# Patient Record
Sex: FEMALE | Race: WHITE | NOT HISPANIC OR LATINO | Employment: OTHER | URBAN - NONMETROPOLITAN AREA
[De-identification: names, ages, dates, MRNs, and addresses within clinical notes are randomized per-mention and may not be internally consistent; named-entity substitution may affect disease eponyms.]

---

## 2018-07-15 ENCOUNTER — HOSPITAL ENCOUNTER (EMERGENCY)
Facility: HOSPITAL | Age: 73
Discharge: HOME | End: 2018-07-15
Attending: EMERGENCY MEDICINE | Admitting: EMERGENCY MEDICINE

## 2018-07-15 ENCOUNTER — APPOINTMENT (OUTPATIENT)
Dept: GENERAL RADIOLOGY | Facility: HOSPITAL | Age: 73
End: 2018-07-15

## 2018-07-15 VITALS
HEIGHT: 63 IN | BODY MASS INDEX: 31.18 KG/M2 | TEMPERATURE: 97.5 F | OXYGEN SATURATION: 96 % | RESPIRATION RATE: 18 BRPM | HEART RATE: 61 BPM | SYSTOLIC BLOOD PRESSURE: 147 MMHG | DIASTOLIC BLOOD PRESSURE: 87 MMHG | WEIGHT: 176 LBS

## 2018-07-15 DIAGNOSIS — S63.501A SPRAIN OF RIGHT WRIST, INITIAL ENCOUNTER: Primary | ICD-10-CM

## 2018-07-15 DIAGNOSIS — S52.501A CLOSED FRACTURE OF DISTAL END OF RIGHT RADIUS, UNSPECIFIED FRACTURE MORPHOLOGY, INITIAL ENCOUNTER: ICD-10-CM

## 2018-07-15 DIAGNOSIS — S52.614A CLOSED NONDISPLACED FRACTURE OF STYLOID PROCESS OF RIGHT ULNA, INITIAL ENCOUNTER: ICD-10-CM

## 2018-07-15 PROCEDURE — 73110 X-RAY EXAM OF WRIST: CPT

## 2018-07-15 PROCEDURE — 99283 EMERGENCY DEPT VISIT LOW MDM: CPT

## 2018-07-15 PROCEDURE — 73090 X-RAY EXAM OF FOREARM: CPT

## 2018-07-15 RX ORDER — HYDROCODONE BITARTRATE AND ACETAMINOPHEN 5; 325 MG/1; MG/1
1 TABLET ORAL ONCE
Status: COMPLETED | OUTPATIENT
Start: 2018-07-15 | End: 2018-07-15

## 2018-07-15 RX ORDER — HYDROCODONE BITARTRATE AND ACETAMINOPHEN 5; 325 MG/1; MG/1
1 TABLET ORAL EVERY 6 HOURS PRN
Qty: 13 TABLET | Refills: 0 | Status: SHIPPED | OUTPATIENT
Start: 2018-07-15 | End: 2018-07-23 | Stop reason: SDUPTHER

## 2018-07-15 RX ADMIN — HYDROCODONE BITARTRATE AND ACETAMINOPHEN 1 TABLET: 5; 325 TABLET ORAL at 07:32

## 2018-07-15 NOTE — ED PROVIDER NOTES
Subjective   Patient presents with a fall on outstretched hand today.  Patient was getting up early to see her son off who is leaving town today.  Patient got tripped up and fell forward on her hand.  There is no significant deformity, but soft tissue swelling and pain.  Patient is had her hand in a wrist splint that she had in her home.  Patient not hear a click pop at the time.  Patient is noticed the pain.  Her son got off on his way and she decided she'd be seen in the ER.  Patient has had a 3/10 at this time.            Review of Systems   Constitutional: Negative.  Negative for appetite change, chills and fever.   HENT: Negative.  Negative for congestion.    Eyes: Negative.  Negative for photophobia and visual disturbance.   Respiratory: Negative.  Negative for cough, chest tightness and shortness of breath.    Cardiovascular: Negative.  Negative for chest pain and palpitations.   Gastrointestinal: Negative.  Negative for abdominal pain, constipation, diarrhea, nausea and vomiting.   Endocrine: Negative.    Genitourinary: Negative.  Negative for decreased urine volume, dysuria, flank pain and hematuria.   Musculoskeletal: Negative.  Negative for arthralgias, back pain, myalgias, neck pain and neck stiffness.   Skin: Negative.  Negative for pallor.   Neurological: Negative.  Negative for dizziness, syncope, weakness, light-headedness, numbness and headaches.   Psychiatric/Behavioral: Negative.  Negative for confusion and suicidal ideas. The patient is not nervous/anxious.        Past Medical History:   Diagnosis Date   • Asthma    • COPD (chronic obstructive pulmonary disease) (CMS/[facility])    • Hx of multiple sclerosis    • Sleep apnea        Allergies   Allergen Reactions   • Erythromycin Nausea And Vomiting   • Naproxen Swelling   • Amoxicillin Rash   • Codeine Other (See Comments)     headache   • Doxycycline Swelling   • Oxcarbazepine Rash   • Penicillins Rash   • Phenytoin Sodium Extended Rash       History  reviewed. No pertinent surgical history.    History reviewed. No pertinent family history.    Social History     Social History   • Marital status:      Social History Main Topics   • Smoking status: Never Smoker   • Alcohol use No   • Drug use: No     Other Topics Concern   • Not on file           Objective   Physical Exam   Constitutional: She is oriented to person, place, and time. She appears well-developed and well-nourished. No distress.   HENT:   Head: Normocephalic and atraumatic.   Eyes: Conjunctivae and EOM are normal. No scleral icterus.   Neck: Normal range of motion. Neck supple. No JVD present.   Pulmonary/Chest: Effort normal. No respiratory distress.   Abdominal: Soft. She exhibits no distension. There is no tenderness.   Musculoskeletal: She exhibits edema and tenderness. She exhibits no deformity.   Patient with soft tissue swelling mostly to the dorsum of the right wrist with ecchymosis in the area.  There is no significant deformity to the area.  There is 2+ radial ulnar pulses.  Patient is full range of motion of the hand.   Lymphadenopathy:     She has no cervical adenopathy.   Neurological: She is alert and oriented to person, place, and time. No cranial nerve deficit. She exhibits normal muscle tone.   Skin: Skin is warm and dry. No rash noted. She is not diaphoretic. No erythema. No pallor.   Psychiatric: She has a normal mood and affect. Her behavior is normal. Judgment and thought content normal.   Nursing note and vitals reviewed.      Procedures           ED Course        Awaiting x-rays          MDM  Number of Diagnoses or Management Options  Closed fracture of distal end of right radius, unspecified fracture morphology, initial encounter:   Closed nondisplaced fracture of styloid process of right ulna, initial encounter:   Sprain of right wrist, initial encounter:   Diagnosis management comments: Patient with a distal radius fracture with joint space involvement and a ulnar  styloid fracture.  Placed in sugar tong splint.  Follow with orthopedics.       Amount and/or Complexity of Data Reviewed  Tests in the radiology section of CPT®: reviewed          Final diagnoses:   Sprain of right wrist, initial encounter   Closed fracture of distal end of right radius, unspecified fracture morphology, initial encounter   Closed nondisplaced fracture of styloid process of right ulna, initial encounter            Sergo Morton MD  07/15/18 0737

## 2018-07-17 ENCOUNTER — OFFICE VISIT (OUTPATIENT)
Dept: ORTHOPEDIC SURGERY | Facility: CLINIC | Age: 73
End: 2018-07-17

## 2018-07-17 VITALS — HEIGHT: 63 IN | BODY MASS INDEX: 32.6 KG/M2 | WEIGHT: 184 LBS

## 2018-07-17 DIAGNOSIS — S52.501A CLOSED TRAUMATIC MINIMALLY DISPLACED FRACTURE OF DISTAL END OF RIGHT RADIUS, INITIAL ENCOUNTER: Primary | ICD-10-CM

## 2018-07-17 DIAGNOSIS — M25.531 RIGHT WRIST PAIN: ICD-10-CM

## 2018-07-17 DIAGNOSIS — Z87.891 HISTORY OF CIGARETTE SMOKING: ICD-10-CM

## 2018-07-17 DIAGNOSIS — J44.9 CHRONIC OBSTRUCTIVE PULMONARY DISEASE, UNSPECIFIED COPD TYPE (HCC): ICD-10-CM

## 2018-07-17 DIAGNOSIS — Z79.01 ANTICOAGULATED ON COUMADIN: ICD-10-CM

## 2018-07-17 DIAGNOSIS — I10 ESSENTIAL HYPERTENSION: ICD-10-CM

## 2018-07-17 DIAGNOSIS — G35 MULTIPLE SCLEROSIS (HCC): ICD-10-CM

## 2018-07-17 DIAGNOSIS — I48.20 ATRIAL FIBRILLATION, CHRONIC (HCC): ICD-10-CM

## 2018-07-17 PROCEDURE — 99203 OFFICE O/P NEW LOW 30 MIN: CPT | Performed by: ORTHOPAEDIC SURGERY

## 2018-07-17 PROCEDURE — 25600 CLTX DST RDL FX/EPHYS SEP WO: CPT | Performed by: ORTHOPAEDIC SURGERY

## 2018-07-17 RX ORDER — FLUTICASONE PROPIONATE 50 MCG
2 SPRAY, SUSPENSION (ML) NASAL DAILY
COMMUNITY

## 2018-07-17 NOTE — PROGRESS NOTES
Elizabeth Griffin is a 72 y.o. female   Primary provider:  BRIGETTE Owusu       Chief Complaint   Patient presents with   • Right Wrist - Pain       HISTORY OF PRESENT ILLNESS: Patient being seen for right wrist pain due to fall occurring on 7/15/18. Patient was seen at  ED and x-rays done. Patient using shoulder immobilizer.   Patient states that she fell down at home on the 15th.  She complained of severe pain in her right wrist.  Her pain has improved since she was evaluated and placed in a splint.  She reports that she is right-handed.  She has no numbness or tingling.  He denies any syncopal episode and no loss of consciousness associated with her fall and fracture.    Pain   This is a new problem. The current episode started in the past 7 days. Associated symptoms include arthralgias and numbness. Associated symptoms comments: Aching. She has tried acetaminophen and NSAIDs for the symptoms.        CONCURRENT MEDICAL HISTORY:    Past Medical History:   Diagnosis Date   • Asthma    • Colitis    • COPD (chronic obstructive pulmonary disease) (CMS/[facility])    • Hx of multiple sclerosis    • Lupus    • Sleep apnea        Allergies   Allergen Reactions   • Erythromycin Nausea And Vomiting   • Naproxen Swelling   • Dilantin [Phenytoin] Itching     Itching and swelling   • Keflex [Cephalexin] Other (See Comments)     Yeast infections   • Menthol Swelling     Itching and swelling   • Amoxicillin Rash   • Codeine Other (See Comments)     headache   • Doxycycline Swelling   • Oxcarbazepine Rash   • Penicillins Rash   • Phenytoin Sodium Extended Rash         Current Outpatient Prescriptions:   •  albuterol (PROVENTIL HFA;VENTOLIN HFA) 108 (90 Base) MCG/ACT inhaler, Inhale 2 puffs Every 4 (Four) Hours As Needed for Wheezing., Disp: , Rfl:   •  albuterol (PROVENTIL) (5 MG/ML) 0.5% nebulizer solution, Take 2.5 mg by nebulization Every 6 (Six) Hours As Needed for Wheezing., Disp: , Rfl:   •  alendronate (FOSAMAX) 70  MG tablet, Take 70 mg by mouth Every 7 (Seven) Days., Disp: , Rfl:   •  budesonide-formoterol (SYMBICORT) 160-4.5 MCG/ACT inhaler, Inhale 2 puffs 2 (Two) Times a Day., Disp: , Rfl:   •  calcium carbonate (OS-RAISA) 600 MG tablet, Take 600 mg by mouth Daily., Disp: , Rfl:   •  carBAMazepine (TEGretol) 200 MG tablet, Take 200 mg by mouth 3 (Three) Times a Day., Disp: , Rfl:   •  Cobalamine Combinations (B-12) 100-5000 MCG sublingual tablet, Place  under the tongue., Disp: , Rfl:   •  dofetilide (TIKOSYN) 500 MCG capsule, Take 500 mcg by mouth 2 (Two) Times a Day., Disp: , Rfl:   •  fluticasone (FLONASE) 50 MCG/ACT nasal spray, 2 sprays into each nostril Daily., Disp: , Rfl:   •  fluticasone (VERAMYST) 27.5 MCG/SPRAY nasal spray, 2 sprays into each nostril Daily., Disp: , Rfl:   •  gabapentin (NEURONTIN) 800 MG tablet, Take 800 mg by mouth 3 (Three) Times a Day., Disp: , Rfl:   •  Glatiramer Acetate (COPAXONE) 40 MG/ML solution prefilled syringe, Inject  under the skin., Disp: , Rfl:   •  guaiFENesin (MUCINEX) 600 MG 12 hr tablet, Take 1,200 mg by mouth 2 (Two) Times a Day., Disp: , Rfl:   •  HYDROcodone-acetaminophen (NORCO) 5-325 MG per tablet, Take 1 tablet by mouth Every 6 (Six) Hours As Needed for Moderate Pain  or Severe Pain ., Disp: 13 tablet, Rfl: 0  •  isosorbide mononitrate (ISMO,MONOKET) 20 MG tablet, Take 20 mg by mouth 2 (Two) Times a Day., Disp: , Rfl:   •  levothyroxine (SYNTHROID, LEVOTHROID) 88 MCG tablet, Take 88 mcg by mouth Daily., Disp: , Rfl:   •  lubiprostone (AMITIZA) 24 MCG capsule, Take 24 mcg by mouth 2 (Two) Times a Day With Meals., Disp: , Rfl:   •  metoprolol tartrate (LOPRESSOR) 25 MG tablet, Take 25 mg by mouth 2 (Two) Times a Day., Disp: , Rfl:   •  montelukast (SINGULAIR) 10 MG tablet, Take 10 mg by mouth Every Night., Disp: , Rfl:   •  NITROFURANTOIN PO, Take  by mouth., Disp: , Rfl:   •  omega-3 acid ethyl esters (LOVAZA) 1 g capsule, Take 2 g by mouth 2 (Two) Times a Day., Disp: ,  "Rfl:   •  Polyethylene Glycol 3350 (MIRALAX PO), Take  by mouth., Disp: , Rfl:   •  pramipexole (MIRAPEX) 1 MG tablet, Take 1 mg by mouth 3 (Three) Times a Day., Disp: , Rfl:   •  PredniSONE 5 MG tablet therapy pack dosepak, Take 1 each by mouth. Take as directed on package instructions., Disp: , Rfl:   •  tiZANidine (ZANAFLEX) 4 MG tablet, Take 4 mg by mouth At Night As Needed for Muscle Spasms., Disp: , Rfl:   •  VALSARTAN PO, Take  by mouth., Disp: , Rfl:   •  warfarin (COUMADIN) 10 MG tablet, Take 10 mg by mouth Daily., Disp: , Rfl:   •  hydrocortisone 1 % cream, Apply  topically 3 (Three) Times a Day for 5 days., Disp: 14 g, Rfl: 0    Past Surgical History:   Procedure Laterality Date   • BLADDER SURGERY     • CATARACT EXTRACTION, BILATERAL  12/2015   • D&C AND LAPAROSCOPY     • HYSTERECTOMY  01/1973       Family History   Problem Relation Age of Onset   • Heart disease Other    • Hypertension Other    • Lung disease Other    • Diabetes Other         Social History     Social History   • Marital status:      Spouse name: N/A   • Number of children: N/A   • Years of education: N/A     Occupational History   • Not on file.     Social History Main Topics   • Smoking status: Never Smoker   • Smokeless tobacco: Never Used   • Alcohol use No   • Drug use: No   • Sexual activity: Not on file     Other Topics Concern   • Not on file     Social History Narrative   • No narrative on file        Review of Systems   HENT: Positive for nosebleeds and tinnitus.    Eyes: Positive for visual disturbance.   Genitourinary: Positive for difficulty urinating.   Musculoskeletal: Positive for arthralgias.   Skin:        itching   Neurological: Positive for dizziness and numbness.   Psychiatric/Behavioral: Positive for sleep disturbance.   All other systems reviewed and are negative.      PHYSICAL EXAMINATION:       Ht 160 cm (63\")   Wt 83.5 kg (184 lb)   BMI 32.59 kg/m²     Physical Exam   Constitutional: She is oriented to " person, place, and time. She appears well-developed and well-nourished.   Neurological: She is alert and oriented to person, place, and time.   Psychiatric: She has a normal mood and affect. Her behavior is normal. Judgment and thought content normal.       GAIT:     []  Normal  []  Antalgic    Assistive device: [x]  None  []  Walker     []  Crutches  []  Cane     []  Wheelchair  []  Stretcher    Right Hand Exam     Tenderness   The patient is experiencing tenderness in the dorsal area, johnson area and radial area.    Other   Erythema: absent  Sensation: normal  Pulse: present    Comments:  Moderate swelling.  Motion, strength, stability testing is deferred due to known fracture.      Left Hand Exam     Tenderness   The patient is experiencing no tenderness.         Range of Motion   The patient has normal left wrist ROM.    Muscle Strength   The patient has normal left wrist strength.    Other   Erythema: absent  Sensation: normal  Pulse: present              Xr Forearm 2 View Right    Result Date: 7/15/2018  Narrative: Procedure:  Right wrist. Right forearm.    Indication:  Trauma, patient fell.   . Technique:  Three views right wrist. Two views right forearm.. Prior relevant exam:  None. Comminuted closed traumatic minimally displaced fracture distal radius with intra-articular extension and dorsal tilt. Slightly displaced avulsion fracture ulnar styloid. The more proximal radius and ulna are unremarkable.     Impression: CONCLUSION: 1. Comminuted fractures distal radius with intra-articular extension and dorsal tilt. Minimally displaced avulsion fracture ulnar styloid. 2. The more proximal radius and ulna are unremarkable. Electronically signed by:  Selvin Olivas MD  7/15/2018 7:20 AM CDT Workstation: [id number]    Xr Wrist 3+ View Right    Result Date: 7/15/2018  Narrative: Procedure:  Right wrist. Right forearm.    Indication:  Trauma, patient fell.   . Technique:  Three views right wrist. Two views right  forearm.. Prior relevant exam:  None. Comminuted closed traumatic minimally displaced fracture distal radius with intra-articular extension and dorsal tilt. Slightly displaced avulsion fracture ulnar styloid. The more proximal radius and ulna are unremarkable.     Impression: CONCLUSION: 1. Comminuted fractures distal radius with intra-articular extension and dorsal tilt. Minimally displaced avulsion fracture ulnar styloid. 2. The more proximal radius and ulna are unremarkable. Electronically signed by:  Selvin Olivas MD  7/15/2018 7:20 AM CDT Workstation: [id number]          ASSESSMENT:    Diagnoses and all orders for this visit:    Closed traumatic minimally displaced fracture of distal end of right radius, initial encounter    Right wrist pain    Atrial fibrillation, chronic (CMS/HCC)    Essential hypertension    Chronic obstructive pulmonary disease, unspecified COPD type (CMS/HCC)    Multiple sclerosis (CMS/HCC)    History of cigarette smoking    Anticoagulated on Coumadin    Other orders  -     fluticasone (FLONASE) 50 MCG/ACT nasal spray; 2 sprays into each nostril Daily.  -     PredniSONE 5 MG tablet therapy pack dosepak; Take 1 each by mouth. Take as directed on package instructions.          PLAN    We had a long discussion regarding treatment options.  She has a minimally displaced intra-articular distal radius fracture in her dominant arm.  However, she has multiple comorbidities and is anticoagulated on warfarin.  She has a history of multiple sclerosis, COPD, hypertension, as well as A. fib.  We discussed treating her in a closed fashion without surgical intervention unless her fracture were to shift and be further displaced.  She'll be treated in an ex-os splint for support and we discussed the possibility of a cast if necessary but she has a skin reaction to the previous splint currently.    Patient's Body mass index is 32.59 kg/m². BMI is above normal parameters. Recommendations include: exercise  counseling and nutrition counseling.      Return in about 10 days (around 7/27/2018) for Recheck with repeat xrays.    Charles Whatley MD

## 2018-07-18 ENCOUNTER — TELEPHONE (OUTPATIENT)
Dept: ORTHOPEDIC SURGERY | Facility: CLINIC | Age: 73
End: 2018-07-18

## 2018-07-18 NOTE — TELEPHONE ENCOUNTER
If she has switched the brace, lets see how it changes with benadryl and the new brace.  If still having issues tomorrow then we can see her and decide what is next.  Thanks  ALEXA

## 2018-07-18 NOTE — TELEPHONE ENCOUNTER
WAS SEEN YESTERDAY BROKEN RT WRIST, SHE HAS BROKEN OUT WITH A RASH , ALL OVER HER ARM. [phone]  DR FORREST

## 2018-07-18 NOTE — TELEPHONE ENCOUNTER
Patient states she has rash from fingertips to elbow after wearing the Exos splint. She has been using cream on her arm as well as taking benadryl which has seemed to help. She also changed back into her velcro wrist splint. Does she need to make appointment?

## 2018-07-19 ENCOUNTER — OFFICE VISIT (OUTPATIENT)
Dept: ORTHOPEDIC SURGERY | Facility: CLINIC | Age: 73
End: 2018-07-19

## 2018-07-19 VITALS — WEIGHT: 185 LBS | HEIGHT: 63 IN | BODY MASS INDEX: 32.78 KG/M2

## 2018-07-19 DIAGNOSIS — L23.9 ALLERGIC CONTACT DERMATITIS, UNSPECIFIED TRIGGER: Primary | ICD-10-CM

## 2018-07-19 DIAGNOSIS — M25.531 RIGHT WRIST PAIN: ICD-10-CM

## 2018-07-19 PROCEDURE — 99024 POSTOP FOLLOW-UP VISIT: CPT | Performed by: NURSE PRACTITIONER

## 2018-07-19 RX ORDER — DIAPER,BRIEF,INFANT-TODD,DISP
EACH MISCELLANEOUS 3 TIMES DAILY
Qty: 14 G | Refills: 0 | Status: SHIPPED | OUTPATIENT
Start: 2018-07-19 | End: 2018-07-24

## 2018-07-19 NOTE — PROGRESS NOTES
Elizabeth Griffin is a 72 y.o. female returns for     Chief Complaint   Patient presents with   • Right Wrist - Follow-up       HISTORY OF PRESENT ILLNESS: Patient being seen for right wrist pain follow up. She was seen on 7/17/18 and exos splint placed. Patient states since wearing splint she is experiencing rash on right arm. Patient has switched to velcro wrist splint.        CONCURRENT MEDICAL HISTORY:    Past Medical History:   Diagnosis Date   • Asthma    • Colitis    • COPD (chronic obstructive pulmonary disease) (CMS/HCC)    • Hx of multiple sclerosis    • Lupus    • Sleep apnea        Allergies   Allergen Reactions   • Erythromycin Nausea And Vomiting   • Naproxen Swelling   • Dilantin [Phenytoin] Itching     Itching and swelling   • Keflex [Cephalexin] Other (See Comments)     Yeast infections   • Menthol Swelling     Itching and swelling   • Amoxicillin Rash   • Codeine Other (See Comments)     headache   • Doxycycline Swelling   • Oxcarbazepine Rash   • Penicillins Rash   • Phenytoin Sodium Extended Rash         Current Outpatient Prescriptions:   •  albuterol (PROVENTIL HFA;VENTOLIN HFA) 108 (90 Base) MCG/ACT inhaler, Inhale 2 puffs Every 4 (Four) Hours As Needed for Wheezing., Disp: , Rfl:   •  albuterol (PROVENTIL) (5 MG/ML) 0.5% nebulizer solution, Take 2.5 mg by nebulization Every 6 (Six) Hours As Needed for Wheezing., Disp: , Rfl:   •  alendronate (FOSAMAX) 70 MG tablet, Take 70 mg by mouth Every 7 (Seven) Days., Disp: , Rfl:   •  budesonide-formoterol (SYMBICORT) 160-4.5 MCG/ACT inhaler, Inhale 2 puffs 2 (Two) Times a Day., Disp: , Rfl:   •  calcium carbonate (OS-RAISA) 600 MG tablet, Take 600 mg by mouth Daily., Disp: , Rfl:   •  carBAMazepine (TEGretol) 200 MG tablet, Take 200 mg by mouth 3 (Three) Times a Day., Disp: , Rfl:   •  Cobalamine Combinations (B-12) 100-5000 MCG sublingual tablet, Place  under the tongue., Disp: , Rfl:   •  dofetilide (TIKOSYN) 500 MCG capsule, Take 500 mcg by mouth 2  (Two) Times a Day., Disp: , Rfl:   •  fluticasone (FLONASE) 50 MCG/ACT nasal spray, 2 sprays into each nostril Daily., Disp: , Rfl:   •  fluticasone (VERAMYST) 27.5 MCG/SPRAY nasal spray, 2 sprays into each nostril Daily., Disp: , Rfl:   •  gabapentin (NEURONTIN) 800 MG tablet, Take 800 mg by mouth 3 (Three) Times a Day., Disp: , Rfl:   •  Glatiramer Acetate (COPAXONE) 40 MG/ML solution prefilled syringe, Inject  under the skin., Disp: , Rfl:   •  guaiFENesin (MUCINEX) 600 MG 12 hr tablet, Take 1,200 mg by mouth 2 (Two) Times a Day., Disp: , Rfl:   •  HYDROcodone-acetaminophen (NORCO) 5-325 MG per tablet, Take 1 tablet by mouth Every 6 (Six) Hours As Needed for Moderate Pain  or Severe Pain ., Disp: 13 tablet, Rfl: 0  •  isosorbide mononitrate (ISMO,MONOKET) 20 MG tablet, Take 20 mg by mouth 2 (Two) Times a Day., Disp: , Rfl:   •  levothyroxine (SYNTHROID, LEVOTHROID) 88 MCG tablet, Take 88 mcg by mouth Daily., Disp: , Rfl:   •  lubiprostone (AMITIZA) 24 MCG capsule, Take 24 mcg by mouth 2 (Two) Times a Day With Meals., Disp: , Rfl:   •  metoprolol tartrate (LOPRESSOR) 25 MG tablet, Take 25 mg by mouth 2 (Two) Times a Day., Disp: , Rfl:   •  montelukast (SINGULAIR) 10 MG tablet, Take 10 mg by mouth Every Night., Disp: , Rfl:   •  NITROFURANTOIN PO, Take  by mouth., Disp: , Rfl:   •  omega-3 acid ethyl esters (LOVAZA) 1 g capsule, Take 2 g by mouth 2 (Two) Times a Day., Disp: , Rfl:   •  Polyethylene Glycol 3350 (MIRALAX PO), Take  by mouth., Disp: , Rfl:   •  pramipexole (MIRAPEX) 1 MG tablet, Take 1 mg by mouth 3 (Three) Times a Day., Disp: , Rfl:   •  PredniSONE 5 MG tablet therapy pack dosepak, Take 1 each by mouth. Take as directed on package instructions., Disp: , Rfl:   •  tiZANidine (ZANAFLEX) 4 MG tablet, Take 4 mg by mouth At Night As Needed for Muscle Spasms., Disp: , Rfl:   •  VALSARTAN PO, Take  by mouth., Disp: , Rfl:   •  warfarin (COUMADIN) 10 MG tablet, Take 10 mg by mouth Daily., Disp: , Rfl:   •   "hydrocortisone 1 % cream, Apply  topically 3 (Three) Times a Day for 5 days., Disp: 14 g, Rfl: 0    Past Surgical History:   Procedure Laterality Date   • BLADDER SURGERY     • CATARACT EXTRACTION, BILATERAL  12/2015   • D&C AND LAPAROSCOPY     • HYSTERECTOMY  01/1973       ROS  No fevers or chills.  No chest pain or shortness of air.  No GI or  disturbances.    PHYSICAL EXAMINATION:       Ht 160 cm (63\")   Wt 83.9 kg (185 lb)   BMI 32.77 kg/m²     Physical Exam   Constitutional: She is oriented to person, place, and time. Vital signs are normal. She appears well-developed and well-nourished. She is cooperative.   HENT:   Head: Normocephalic and atraumatic.   Neck: Trachea normal and phonation normal.   Pulmonary/Chest: Effort normal. No respiratory distress.   Abdominal: Soft. Normal appearance. She exhibits no distension.   Neurological: She is alert and oriented to person, place, and time. GCS eye subscore is 4. GCS verbal subscore is 5. GCS motor subscore is 6.   Skin: Skin is warm, dry and intact.   Psychiatric: She has a normal mood and affect. Her speech is normal and behavior is normal. Judgment and thought content normal. Cognition and memory are normal.   Vitals reviewed.      GAIT:     []  Normal  []  Antalgic    Assistive device: [x]  None  []  Walker     []  Crutches  []  Cane     []  Wheelchair  []  Stretcher    Right Hand Exam     Comments:  Diffuse rash noted from the wrist and elbow were the patient had contact with the exos splint   No evidence of infection       Left Hand Exam   Left hand exam is normal.              Xr Forearm 2 View Right    Result Date: 7/15/2018  Narrative: Procedure:  Right wrist. Right forearm.    Indication:  Trauma, patient fell.   . Technique:  Three views right wrist. Two views right forearm.. Prior relevant exam:  None. Comminuted closed traumatic minimally displaced fracture distal radius with intra-articular extension and dorsal tilt. Slightly displaced avulsion " fracture ulnar styloid. The more proximal radius and ulna are unremarkable.     Impression: CONCLUSION: 1. Comminuted fractures distal radius with intra-articular extension and dorsal tilt. Minimally displaced avulsion fracture ulnar styloid. 2. The more proximal radius and ulna are unremarkable. Electronically signed by:  Selvin Olivas MD  7/15/2018 7:20 AM CDT Workstation: [id number]    Xr Wrist 3+ View Right    Result Date: 7/15/2018  Narrative: Procedure:  Right wrist. Right forearm.    Indication:  Trauma, patient fell.   . Technique:  Three views right wrist. Two views right forearm.. Prior relevant exam:  None. Comminuted closed traumatic minimally displaced fracture distal radius with intra-articular extension and dorsal tilt. Slightly displaced avulsion fracture ulnar styloid. The more proximal radius and ulna are unremarkable.     Impression: CONCLUSION: 1. Comminuted fractures distal radius with intra-articular extension and dorsal tilt. Minimally displaced avulsion fracture ulnar styloid. 2. The more proximal radius and ulna are unremarkable. Electronically signed by:  Selvin Olivas MD  7/15/2018 7:20 AM CDT Workstation: [id number]            ASSESSMENT:    Diagnoses and all orders for this visit:    Allergic contact dermatitis, unspecified trigger    Right wrist pain    Other orders  -     hydrocortisone 1 % cream; Apply  topically 3 (Three) Times a Day for 5 days.          PLAN  Recommend topical steroid application, the splint was changed to a plan wrist control splint and patient was instructed to follow up in one week for recheck with Dr. Whatley.   No Follow-up on file.    BRIGETTE Ram

## 2018-07-24 RX ORDER — HYDROCODONE BITARTRATE AND ACETAMINOPHEN 5; 325 MG/1; MG/1
1 TABLET ORAL EVERY 6 HOURS PRN
Qty: 13 TABLET | Refills: 0 | Status: SHIPPED | OUTPATIENT
Start: 2018-07-24 | End: 2018-07-24 | Stop reason: ALTCHOICE

## 2018-07-24 RX ORDER — HYDROCODONE BITARTRATE AND ACETAMINOPHEN 5; 325 MG/1; MG/1
1 TABLET ORAL EVERY 6 HOURS PRN
Qty: 30 TABLET | Refills: 0 | Status: SHIPPED | OUTPATIENT
Start: 2018-07-24 | End: 2018-07-27

## 2018-07-27 ENCOUNTER — OFFICE VISIT (OUTPATIENT)
Dept: ORTHOPEDIC SURGERY | Facility: CLINIC | Age: 73
End: 2018-07-27

## 2018-07-27 VITALS — WEIGHT: 178.5 LBS | HEIGHT: 63 IN | BODY MASS INDEX: 31.63 KG/M2

## 2018-07-27 DIAGNOSIS — I48.20 ATRIAL FIBRILLATION, CHRONIC (HCC): ICD-10-CM

## 2018-07-27 DIAGNOSIS — S52.501A CLOSED TRAUMATIC MINIMALLY DISPLACED FRACTURE OF DISTAL END OF RIGHT RADIUS, INITIAL ENCOUNTER: Primary | ICD-10-CM

## 2018-07-27 DIAGNOSIS — J44.9 CHRONIC OBSTRUCTIVE PULMONARY DISEASE, UNSPECIFIED COPD TYPE (HCC): ICD-10-CM

## 2018-07-27 DIAGNOSIS — G35 MULTIPLE SCLEROSIS (HCC): ICD-10-CM

## 2018-07-27 DIAGNOSIS — M25.531 RIGHT WRIST PAIN: ICD-10-CM

## 2018-07-27 PROCEDURE — 99024 POSTOP FOLLOW-UP VISIT: CPT | Performed by: ORTHOPAEDIC SURGERY

## 2018-07-27 NOTE — PROGRESS NOTES
Elizabeth Griffin is a 73 y.o. female returns for     Chief Complaint   Patient presents with   • Right Wrist - Follow-up       HISTORY OF PRESENT ILLNESS: Patient being seen for right wrist follow up. Patient states rash has went away with use of steroid cream. Patient states she has had an allergic reaction to Norco causing chest pain. Patient also demonstrates decreased balance. X-rays today.   Not sleeping well but slowly getting better.       CONCURRENT MEDICAL HISTORY:    Past Medical History:   Diagnosis Date   • Asthma    • Colitis    • COPD (chronic obstructive pulmonary disease) (CMS/[facility])    • Hx of multiple sclerosis    • Lupus    • Sleep apnea        Allergies   Allergen Reactions   • Erythromycin Nausea And Vomiting   • Naproxen Swelling   • Dilantin [Phenytoin] Itching     Itching and swelling   • Keflex [Cephalexin] Other (See Comments)     Yeast infections   • Menthol Swelling     Itching and swelling   • Norco [Hydrocodone-Acetaminophen] Other (See Comments)     Chest pain   • Amoxicillin Rash   • Codeine Other (See Comments)     headache   • Doxycycline Swelling   • Oxcarbazepine Rash   • Penicillins Rash   • Phenytoin Sodium Extended Rash         Current Outpatient Prescriptions:   •  albuterol (PROVENTIL HFA;VENTOLIN HFA) 108 (90 Base) MCG/ACT inhaler, Inhale 2 puffs Every 4 (Four) Hours As Needed for Wheezing., Disp: , Rfl:   •  albuterol (PROVENTIL) (5 MG/ML) 0.5% nebulizer solution, Take 2.5 mg by nebulization Every 6 (Six) Hours As Needed for Wheezing., Disp: , Rfl:   •  alendronate (FOSAMAX) 70 MG tablet, Take 70 mg by mouth Every 7 (Seven) Days., Disp: , Rfl:   •  budesonide-formoterol (SYMBICORT) 160-4.5 MCG/ACT inhaler, Inhale 2 puffs 2 (Two) Times a Day., Disp: , Rfl:   •  calcium carbonate (OS-RAISA) 600 MG tablet, Take 600 mg by mouth Daily., Disp: , Rfl:   •  carBAMazepine (TEGretol) 200 MG tablet, Take 200 mg by mouth 3 (Three) Times a Day., Disp: , Rfl:   •  Cobalamine Combinations  (B-12) 100-5000 MCG sublingual tablet, Place  under the tongue., Disp: , Rfl:   •  dofetilide (TIKOSYN) 500 MCG capsule, Take 500 mcg by mouth 2 (Two) Times a Day., Disp: , Rfl:   •  fluticasone (FLONASE) 50 MCG/ACT nasal spray, 2 sprays into each nostril Daily., Disp: , Rfl:   •  fluticasone (VERAMYST) 27.5 MCG/SPRAY nasal spray, 2 sprays into each nostril Daily., Disp: , Rfl:   •  gabapentin (NEURONTIN) 800 MG tablet, Take 800 mg by mouth 3 (Three) Times a Day., Disp: , Rfl:   •  Glatiramer Acetate (COPAXONE) 40 MG/ML solution prefilled syringe, Inject  under the skin., Disp: , Rfl:   •  guaiFENesin (MUCINEX) 600 MG 12 hr tablet, Take 1,200 mg by mouth 2 (Two) Times a Day., Disp: , Rfl:   •  isosorbide mononitrate (ISMO,MONOKET) 20 MG tablet, Take 20 mg by mouth 2 (Two) Times a Day., Disp: , Rfl:   •  levothyroxine (SYNTHROID, LEVOTHROID) 88 MCG tablet, Take 88 mcg by mouth Daily., Disp: , Rfl:   •  lubiprostone (AMITIZA) 24 MCG capsule, Take 24 mcg by mouth 2 (Two) Times a Day With Meals., Disp: , Rfl:   •  metoprolol tartrate (LOPRESSOR) 25 MG tablet, Take 25 mg by mouth 2 (Two) Times a Day., Disp: , Rfl:   •  montelukast (SINGULAIR) 10 MG tablet, Take 10 mg by mouth Every Night., Disp: , Rfl:   •  NITROFURANTOIN PO, Take  by mouth., Disp: , Rfl:   •  omega-3 acid ethyl esters (LOVAZA) 1 g capsule, Take 2 g by mouth 2 (Two) Times a Day., Disp: , Rfl:   •  Polyethylene Glycol 3350 (MIRALAX PO), Take  by mouth., Disp: , Rfl:   •  pramipexole (MIRAPEX) 1 MG tablet, Take 1 mg by mouth 3 (Three) Times a Day., Disp: , Rfl:   •  PredniSONE 5 MG tablet therapy pack dosepak, Take 1 each by mouth. Take as directed on package instructions., Disp: , Rfl:   •  tiZANidine (ZANAFLEX) 4 MG tablet, Take 4 mg by mouth At Night As Needed for Muscle Spasms., Disp: , Rfl:   •  VALSARTAN PO, Take  by mouth., Disp: , Rfl:   •  warfarin (COUMADIN) 10 MG tablet, Take 10 mg by mouth Daily., Disp: , Rfl:     Past Surgical History:  "  Procedure Laterality Date   • BLADDER SURGERY     • CATARACT EXTRACTION, BILATERAL  12/2015   • D&C AND LAPAROSCOPY     • HYSTERECTOMY  01/1973       ROS  No fevers or chills.  No chest pain or shortness of air.  No GI or  disturbances.    PHYSICAL EXAMINATION:       Ht 160 cm (63\")   Wt 81 kg (178 lb 8 oz)   BMI 31.62 kg/m²     Physical Exam   Constitutional: She is oriented to person, place, and time. She appears well-developed and well-nourished.   Neurological: She is alert and oriented to person, place, and time.   Psychiatric: She has a normal mood and affect. Her behavior is normal. Judgment and thought content normal.       GAIT:     [x]  Normal  []  Antalgic    Assistive device: [x]  None  []  Walker     []  Crutches  []  Cane     []  Wheelchair  []  Stretcher    Right Hand Exam     Comments:  Good cap refill  Good finger motion  Tolerates a little motion but not much.              Xr Forearm 2 View Right    Result Date: 7/15/2018  Narrative: Procedure:  Right wrist. Right forearm.    Indication:  Trauma, patient fell.   . Technique:  Three views right wrist. Two views right forearm.. Prior relevant exam:  None. Comminuted closed traumatic minimally displaced fracture distal radius with intra-articular extension and dorsal tilt. Slightly displaced avulsion fracture ulnar styloid. The more proximal radius and ulna are unremarkable.     Impression: CONCLUSION: 1. Comminuted fractures distal radius with intra-articular extension and dorsal tilt. Minimally displaced avulsion fracture ulnar styloid. 2. The more proximal radius and ulna are unremarkable. Electronically signed by:  Selvin Olivas MD  7/15/2018 7:20 AM CDT Workstation: [id number]    Xr Wrist 3+ View Right    Result Date: 7/27/2018  Narrative: Ordering Provider:  Charles Whatley MD Ordering Diagnosis/Indication:  Closed traumatic minimally displaced fracture of distal end of right radius, initial encounter Procedure:  XR WRIST 3+ VW RIGHT " Exam Date:  7/27/18 COMPARISON:  Todays X-rays were compared to previous images dated July 15, 2018.     Impression:  3 views of the right wrist show acceptable position and alignment of a right comminuted intra-articular distal radius fracture.  Acceptable alignment is maintained on both the AP, oblique and lateral views.  There is mild joint space depression noted on the lateral view but unchanged from previous x-rays.  No interval change. Charles Whatley MD 7/27/18     Xr Wrist 3+ View Right    Result Date: 7/15/2018  Narrative: Procedure:  Right wrist. Right forearm.    Indication:  Trauma, patient fell.   . Technique:  Three views right wrist. Two views right forearm.. Prior relevant exam:  None. Comminuted closed traumatic minimally displaced fracture distal radius with intra-articular extension and dorsal tilt. Slightly displaced avulsion fracture ulnar styloid. The more proximal radius and ulna are unremarkable.     Impression: CONCLUSION: 1. Comminuted fractures distal radius with intra-articular extension and dorsal tilt. Minimally displaced avulsion fracture ulnar styloid. 2. The more proximal radius and ulna are unremarkable. Electronically signed by:  Selvin Olivas MD  7/15/2018 7:20 AM CDT Workstation: [id number]            ASSESSMENT:    Diagnoses and all orders for this visit:    Closed traumatic minimally displaced fracture of distal end of right radius, initial encounter    Right wrist pain    Atrial fibrillation, chronic (CMS/HCC)    Chronic obstructive pulmonary disease, unspecified COPD type (CMS/HCC)    Multiple sclerosis (CMS/HCC)          PLAN    Continue with finger motion exercises.  We also discussed slowly starting flexion and extension of the wrist.  No force or resistance.  Follow-up in 4 weeks with repeat x-rays of the right wrist.    Patient's Body mass index is 31.62 kg/m². BMI is above normal parameters. Recommendations include: exercise counseling and nutrition  counseling.      Return in about 4 weeks (around 8/24/2018) for Recheck with repeat xrays.    Charles Whatley MD

## 2018-08-27 DIAGNOSIS — S52.501A CLOSED TRAUMATIC MINIMALLY DISPLACED FRACTURE OF DISTAL END OF RIGHT RADIUS, INITIAL ENCOUNTER: Primary | ICD-10-CM

## 2018-08-28 ENCOUNTER — OFFICE VISIT (OUTPATIENT)
Dept: ORTHOPEDIC SURGERY | Facility: CLINIC | Age: 73
End: 2018-08-28

## 2018-08-28 VITALS — WEIGHT: 186 LBS | BODY MASS INDEX: 32.96 KG/M2 | HEIGHT: 63 IN

## 2018-08-28 DIAGNOSIS — M25.531 RIGHT WRIST PAIN: ICD-10-CM

## 2018-08-28 DIAGNOSIS — S52.501D CLOSED TRAUMATIC MINIMALLY DISPLACED FRACTURE OF DISTAL END OF RIGHT RADIUS WITH ROUTINE HEALING, SUBSEQUENT ENCOUNTER: Primary | ICD-10-CM

## 2018-08-28 PROCEDURE — 99024 POSTOP FOLLOW-UP VISIT: CPT | Performed by: ORTHOPAEDIC SURGERY

## 2018-10-05 DIAGNOSIS — S52.501D CLOSED TRAUMATIC MINIMALLY DISPLACED FRACTURE OF DISTAL END OF RIGHT RADIUS WITH ROUTINE HEALING, SUBSEQUENT ENCOUNTER: Primary | ICD-10-CM

## 2018-10-09 ENCOUNTER — OFFICE VISIT (OUTPATIENT)
Dept: ORTHOPEDIC SURGERY | Facility: CLINIC | Age: 73
End: 2018-10-09

## 2018-10-09 VITALS — BODY MASS INDEX: 33.84 KG/M2 | WEIGHT: 191 LBS | HEIGHT: 63 IN

## 2018-10-09 DIAGNOSIS — S52.501D CLOSED TRAUMATIC MINIMALLY DISPLACED FRACTURE OF DISTAL END OF RIGHT RADIUS WITH ROUTINE HEALING, SUBSEQUENT ENCOUNTER: Primary | ICD-10-CM

## 2018-10-09 DIAGNOSIS — M25.531 RIGHT WRIST PAIN: ICD-10-CM

## 2018-10-09 PROBLEM — S52.501A: Status: ACTIVE | Noted: 2018-10-09

## 2018-10-09 PROCEDURE — 99024 POSTOP FOLLOW-UP VISIT: CPT | Performed by: ORTHOPAEDIC SURGERY

## 2019-01-01 ENCOUNTER — APPOINTMENT (OUTPATIENT)
Dept: CT IMAGING | Facility: HOSPITAL | Age: 74
End: 2019-01-01

## 2019-01-01 ENCOUNTER — OFFICE VISIT (OUTPATIENT)
Dept: ORTHOPEDIC SURGERY | Facility: CLINIC | Age: 74
End: 2019-01-01

## 2019-01-01 ENCOUNTER — TELEPHONE (OUTPATIENT)
Dept: ORTHOPEDIC SURGERY | Facility: CLINIC | Age: 74
End: 2019-01-01

## 2019-01-01 ENCOUNTER — APPOINTMENT (OUTPATIENT)
Dept: GENERAL RADIOLOGY | Facility: HOSPITAL | Age: 74
End: 2019-01-01

## 2019-01-01 ENCOUNTER — TRANSCRIBE ORDERS (OUTPATIENT)
Dept: ORTHOPEDIC SURGERY | Facility: CLINIC | Age: 74
End: 2019-01-01

## 2019-01-01 ENCOUNTER — HOSPITAL ENCOUNTER (EMERGENCY)
Facility: HOSPITAL | Age: 74
Discharge: ANOTHER HOSPITAL | End: 2019-08-17
Attending: EMERGENCY MEDICINE | Admitting: EMERGENCY MEDICINE

## 2019-01-01 VITALS
RESPIRATION RATE: 18 BRPM | TEMPERATURE: 98.4 F | BODY MASS INDEX: 33.89 KG/M2 | WEIGHT: 191.3 LBS | DIASTOLIC BLOOD PRESSURE: 78 MMHG | OXYGEN SATURATION: 97 % | HEART RATE: 70 BPM | SYSTOLIC BLOOD PRESSURE: 171 MMHG

## 2019-01-01 VITALS — HEIGHT: 63 IN | BODY MASS INDEX: 31.54 KG/M2 | WEIGHT: 178 LBS

## 2019-01-01 VITALS — WEIGHT: 177.3 LBS | HEIGHT: 63 IN | BODY MASS INDEX: 31.41 KG/M2

## 2019-01-01 DIAGNOSIS — G89.29 CHRONIC RIGHT SHOULDER PAIN: ICD-10-CM

## 2019-01-01 DIAGNOSIS — M25.511 CHRONIC RIGHT SHOULDER PAIN: ICD-10-CM

## 2019-01-01 DIAGNOSIS — M75.101 ROTATOR CUFF SYNDROME, RIGHT: ICD-10-CM

## 2019-01-01 DIAGNOSIS — M25.511 RIGHT SHOULDER PAIN, UNSPECIFIED CHRONICITY: Primary | ICD-10-CM

## 2019-01-01 DIAGNOSIS — S36.039A LACERATION OF SPLEEN, INITIAL ENCOUNTER: Primary | ICD-10-CM
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
## 2019-01-01 DIAGNOSIS — M75.101 ROTATOR CUFF SYNDROME, RIGHT: Primary | ICD-10-CM

## 2019-01-01 DIAGNOSIS — I10 ESSENTIAL HYPERTENSION: ICD-10-CM

## 2019-01-01 DIAGNOSIS — G35 MULTIPLE SCLEROSIS (HCC): ICD-10-CM

## 2019-01-01 DIAGNOSIS — M75.41 IMPINGEMENT SYNDROME OF RIGHT SHOULDER: ICD-10-CM

## 2019-01-01 DIAGNOSIS — S01.312A LACERATION OF LEFT EAR, INITIAL ENCOUNTER: ICD-10-CM

## 2019-01-01 LAB
ALBUMIN SERPL-MCNC: 4.3 G/DL (ref 3.5–5.2)
ALBUMIN/GLOB SERPL: 1.5 G/DL
ALP SERPL-CCNC: 89 U/L (ref 39–117)
ALT SERPL W P-5'-P-CCNC: 29 U/L (ref 1–33)
ANION GAP SERPL CALCULATED.3IONS-SCNC: 11 MMOL/L (ref 5–15)
AST SERPL-CCNC: 36 U/L (ref 1–32)
BASOPHILS # BLD AUTO: 0.03 10*3/MM3 (ref 0–0.2)
BASOPHILS NFR BLD AUTO: 0.3 % (ref 0–1.5)
BILIRUB SERPL-MCNC: 0.5 MG/DL (ref 0.2–1.2)
BILIRUB UR QL STRIP: NEGATIVE
BUN BLD-MCNC: 11 MG/DL (ref 8–23)
BUN/CREAT SERPL: 15.1 (ref 7–25)
CALCIUM SPEC-SCNC: 10 MG/DL (ref 8.6–10.5)
CHLORIDE SERPL-SCNC: 104 MMOL/L (ref 98–107)
CLARITY UR: CLEAR
CO2 SERPL-SCNC: 26 MMOL/L (ref 22–29)
COLOR UR: YELLOW
CREAT BLD-MCNC: 0.73 MG/DL (ref 0.57–1)
DEPRECATED RDW RBC AUTO: 42.3 FL (ref 37–54)
EOSINOPHIL # BLD AUTO: 0.36 10*3/MM3 (ref 0–0.4)
EOSINOPHIL NFR BLD AUTO: 3.8 % (ref 0.3–6.2)
ERYTHROCYTE [DISTWIDTH] IN BLOOD BY AUTOMATED COUNT: 13.2 % (ref 12.3–15.4)
GFR SERPL CREATININE-BSD FRML MDRD: 78 ML/MIN/1.73
GLOBULIN UR ELPH-MCNC: 2.9 GM/DL
GLUCOSE BLD-MCNC: 108 MG/DL (ref 65–99)
GLUCOSE UR STRIP-MCNC: NEGATIVE MG/DL
HCT VFR BLD AUTO: 36 % (ref 34–46.6)
HGB BLD-MCNC: 12.1 G/DL (ref 12–15.9)
HGB UR QL STRIP.AUTO: NEGATIVE
IMM GRANULOCYTES # BLD AUTO: 0.03 10*3/MM3 (ref 0–0.05)
IMM GRANULOCYTES NFR BLD AUTO: 0.3 % (ref 0–0.5)
INR PPP: 1.07 (ref 0.8–1.2)
KETONES UR QL STRIP: NEGATIVE
LEUKOCYTE ESTERASE UR QL STRIP.AUTO: NEGATIVE
LIPASE SERPL-CCNC: 21 U/L (ref 13–60)
LYMPHOCYTES # BLD AUTO: 1.97 10*3/MM3 (ref 0.7–3.1)
LYMPHOCYTES NFR BLD AUTO: 20.9 % (ref 19.6–45.3)
MCH RBC QN AUTO: 29.4 PG (ref 26.6–33)
MCHC RBC AUTO-ENTMCNC: 33.6 G/DL (ref 31.5–35.7)
MCV RBC AUTO: 87.4 FL (ref 79–97)
MONOCYTES # BLD AUTO: 0.78 10*3/MM3 (ref 0.1–0.9)
MONOCYTES NFR BLD AUTO: 8.3 % (ref 5–12)
NEUTROPHILS # BLD AUTO: 6.25 10*3/MM3 (ref 1.7–7)
NEUTROPHILS NFR BLD AUTO: 66.4 % (ref 42.7–76)
NITRITE UR QL STRIP: NEGATIVE
NRBC BLD AUTO-RTO: 0 /100 WBC (ref 0–0.2)
PH UR STRIP.AUTO: 6.5 [PH] (ref 5–9)
PLATELET # BLD AUTO: 170 10*3/MM3 (ref 140–450)
PMV BLD AUTO: 11.7 FL (ref 6–12)
POTASSIUM BLD-SCNC: 3.8 MMOL/L (ref 3.5–5.2)
PROT SERPL-MCNC: 7.2 G/DL (ref 6–8.5)
PROT UR QL STRIP: NEGATIVE
PROTHROMBIN TIME: 13.7 SECONDS (ref 11.1–15.3)
RBC # BLD AUTO: 4.12 10*6/MM3 (ref 3.77–5.28)
SODIUM BLD-SCNC: 141 MMOL/L (ref 136–145)
SP GR UR STRIP: 1.01 (ref 1–1.03)
UROBILINOGEN UR QL STRIP: NORMAL
WBC NRBC COR # BLD: 9.42 10*3/MM3 (ref 3.4–10.8)

## 2019-01-01 PROCEDURE — 72131 CT LUMBAR SPINE W/O DYE: CPT

## 2019-01-01 PROCEDURE — 96374 THER/PROPH/DIAG INJ IV PUSH: CPT

## 2019-01-01 PROCEDURE — 90715 TDAP VACCINE 7 YRS/> IM: CPT | Performed by: EMERGENCY MEDICINE

## 2019-01-01 PROCEDURE — 83690 ASSAY OF LIPASE: CPT | Performed by: EMERGENCY MEDICINE

## 2019-01-01 PROCEDURE — 90471 IMMUNIZATION ADMIN: CPT | Performed by: EMERGENCY MEDICINE

## 2019-01-01 PROCEDURE — 73564 X-RAY EXAM KNEE 4 OR MORE: CPT

## 2019-01-01 PROCEDURE — 99285 EMERGENCY DEPT VISIT HI MDM: CPT

## 2019-01-01 PROCEDURE — 99214 OFFICE O/P EST MOD 30 MIN: CPT | Performed by: ORTHOPAEDIC SURGERY

## 2019-01-01 PROCEDURE — 94640 AIRWAY INHALATION TREATMENT: CPT

## 2019-01-01 PROCEDURE — 25010000002 ONDANSETRON PER 1 MG: Performed by: EMERGENCY MEDICINE

## 2019-01-01 PROCEDURE — 73502 X-RAY EXAM HIP UNI 2-3 VIEWS: CPT

## 2019-01-01 PROCEDURE — 25010000002 TDAP 5-2.5-18.5 LF-MCG/0.5 SUSPENSION: Performed by: EMERGENCY MEDICINE

## 2019-01-01 PROCEDURE — 72128 CT CHEST SPINE W/O DYE: CPT

## 2019-01-01 PROCEDURE — 70450 CT HEAD/BRAIN W/O DYE: CPT

## 2019-01-01 PROCEDURE — 85610 PROTHROMBIN TIME: CPT | Performed by: EMERGENCY MEDICINE

## 2019-01-01 PROCEDURE — 71250 CT THORAX DX C-: CPT

## 2019-01-01 PROCEDURE — 74177 CT ABD & PELVIS W/CONTRAST: CPT

## 2019-01-01 PROCEDURE — 85025 COMPLETE CBC W/AUTO DIFF WBC: CPT | Performed by: EMERGENCY MEDICINE

## 2019-01-01 PROCEDURE — 25010000002 MORPHINE PER 10 MG: Performed by: EMERGENCY MEDICINE

## 2019-01-01 PROCEDURE — 72125 CT NECK SPINE W/O DYE: CPT

## 2019-01-01 PROCEDURE — 74176 CT ABD & PELVIS W/O CONTRAST: CPT

## 2019-01-01 PROCEDURE — 25010000002 IOPAMIDOL 61 % SOLUTION: Performed by: EMERGENCY MEDICINE

## 2019-01-01 PROCEDURE — 81003 URINALYSIS AUTO W/O SCOPE: CPT | Performed by: EMERGENCY MEDICINE

## 2019-01-01 PROCEDURE — 96375 TX/PRO/DX INJ NEW DRUG ADDON: CPT

## 2019-01-01 PROCEDURE — 99213 OFFICE O/P EST LOW 20 MIN: CPT | Performed by: ORTHOPAEDIC SURGERY

## 2019-01-01 PROCEDURE — 80053 COMPREHEN METABOLIC PANEL: CPT | Performed by: EMERGENCY MEDICINE

## 2019-01-01 PROCEDURE — 73030 X-RAY EXAM OF SHOULDER: CPT

## 2019-01-01 RX ORDER — MECLIZINE HYDROCHLORIDE 25 MG/1
25 TABLET ORAL ONCE
Status: COMPLETED | OUTPATIENT
Start: 2019-01-01 | End: 2019-01-01

## 2019-01-01 RX ORDER — ONDANSETRON 2 MG/ML
4 INJECTION INTRAMUSCULAR; INTRAVENOUS ONCE
Status: COMPLETED | OUTPATIENT
Start: 2019-01-01 | End: 2019-01-01

## 2019-01-01 RX ORDER — IPRATROPIUM BROMIDE AND ALBUTEROL SULFATE 2.5; .5 MG/3ML; MG/3ML
3 SOLUTION RESPIRATORY (INHALATION) ONCE
Status: COMPLETED | OUTPATIENT
Start: 2019-01-01 | End: 2019-01-01

## 2019-01-01 RX ORDER — SODIUM CHLORIDE 0.9 % (FLUSH) 0.9 %
10 SYRINGE (ML) INJECTION AS NEEDED
Status: DISCONTINUED | OUTPATIENT
Start: 2019-01-01 | End: 2019-01-01 | Stop reason: HOSPADM

## 2019-01-01 RX ADMIN — ONDANSETRON 4 MG: 2 INJECTION INTRAMUSCULAR; INTRAVENOUS at 02:43

## 2019-01-01 RX ADMIN — MECLIZINE HYDROCHLORIDE 25 MG: 25 TABLET ORAL at 02:43

## 2019-01-01 RX ADMIN — TETANUS TOXOID, REDUCED DIPHTHERIA TOXOID AND ACELLULAR PERTUSSIS VACCINE, ADSORBED 0.5 ML: 5; 2.5; 8; 8; 2.5 SUSPENSION INTRAMUSCULAR at 05:27

## 2019-01-01 RX ADMIN — IOPAMIDOL 46 ML: 612 INJECTION, SOLUTION INTRAVENOUS at 04:25

## 2019-01-01 RX ADMIN — IPRATROPIUM BROMIDE AND ALBUTEROL SULFATE 3 ML: 2.5; .5 SOLUTION RESPIRATORY (INHALATION) at 00:44

## 2019-01-01 RX ADMIN — MORPHINE SULFATE 4 MG: 4 INJECTION INTRAVENOUS at 04:53

## 2019-01-07 ENCOUNTER — TRANSCRIBE ORDERS (OUTPATIENT)
Dept: PHYSICAL THERAPY | Facility: HOSPITAL | Age: 74
End: 2019-01-07

## 2019-01-07 DIAGNOSIS — J44.9 OBSTRUCTIVE CHRONIC BRONCHITIS WITHOUT EXACERBATION (HCC): ICD-10-CM

## 2019-01-07 DIAGNOSIS — M19.90 CHRONIC ARTHRITIS: ICD-10-CM

## 2019-01-07 DIAGNOSIS — R26.81 UNSTEADY GAIT: Primary | ICD-10-CM

## 2019-01-07 DIAGNOSIS — R06.02 SHORTNESS OF BREATH: ICD-10-CM

## 2019-01-07 DIAGNOSIS — I48.91 ATRIAL FIBRILLATION, UNSPECIFIED TYPE (HCC): ICD-10-CM

## 2019-01-14 ENCOUNTER — HOSPITAL ENCOUNTER (OUTPATIENT)
Dept: PHYSICAL THERAPY | Facility: HOSPITAL | Age: 74
Setting detail: THERAPIES SERIES
Discharge: HOME | End: 2019-01-14

## 2019-01-14 DIAGNOSIS — R26.81 UNSTEADINESS ON FEET: ICD-10-CM

## 2019-01-14 DIAGNOSIS — R29.6 FREQUENT FALLS: Primary | ICD-10-CM

## 2019-01-14 DIAGNOSIS — R06.02 SHORTNESS OF BREATH: ICD-10-CM

## 2019-01-14 PROCEDURE — 97161 PT EVAL LOW COMPLEX 20 MIN: CPT

## 2019-01-15 NOTE — THERAPY DISCHARGE NOTE
Outpatient Physical Therapy Ortho Initial Evaluation/Discharge  [facility]     Patient Name: Elizabeth Griffin  : 1945  MRN: [id number]  Today's Date: 1/15/2019      Visit Date: 2019    Patient Active Problem List   Diagnosis   • Traumatic closed fracture of distal end of right radius with minimal displacement   • Right wrist pain        Past Medical History:   Diagnosis Date   • Asthma    • Colitis    • COPD (chronic obstructive pulmonary disease) (CMS/[facility])    • Hx of multiple sclerosis    • Lupus    • Sleep apnea         Past Surgical History:   Procedure Laterality Date   • BLADDER SURGERY     • CATARACT EXTRACTION, BILATERAL  2015   • D&C AND LAPAROSCOPY     • HYSTERECTOMY  1973         Visit Dx:     ICD-10-CM ICD-9-CM   1. Frequent falls R29.6 V15.88   2. Unsteadiness on feet R26.81 781.2   3. Shortness of breath R06.02 786.05       Patient History     Row Name 19 1900             History    Brief Description of Current Complaint  The pt presents to clinic today for eval for a power wheelchair. Pt states she has difficulty breathing. She states she has asthmatic bronchitis and COPD that make her breathing difficult. She states that she has MS that flares-up and affects her L side mostly. MS has started to gradually affect her R leg too. The pt states she has been diagnosed w/ MS since ; however, over time her MS has progressed a lot. She states she has had 2 falls in the past couple weeks. She requires the use of a SPC when ambulating in the community at this time. She states she feels like she needs a power wheelchair secondary to being unable to ambulate due to weakness when her MS flares up. She states she is not experiencing MS flare-up at this time. The pt states she is able to ambulate around the grocery store w/ a cart but it requires her extensive time. The pt states she is able to walk short distances if she takes her time, but this requires great effort and  she becomes winded when walking beyond a short distance. Pt states she is unable to  in distance an estimate of how far she can walk w/o getting tired. (Pt’s WC representative present during eval.)   -        User Key  (r) = Recorded By, (t) = Taken By, (c) = Cosigned By    Initials Name Provider Type    Soila Torrez, PT Physical Therapist          PT Ortho     Row Name 01/15/19 0800       Subjective Comments    Subjective Comments  See hx  -       Posture/Observations    Posture/Observations Comments  Head and neck control WFL. Trunk control WFL, pt has slight Kyphotic posture.  O2 sats remained >94% throughout exam and after ambulating 270 feet. The pt reports feeling SOA and has increased respiration after ambulation.   -       MMT (Manual Muscle Testing)    General MMT Comments  R UE grossly 4/5, L UE grossly 4-/5 except shoulder extension 4/5 bilat. R hip flex 4-/5, R hip abd/add 4+/5, R knee grossly 4+/5, L hip flex 3-/5, L hip abd/add 4-/5, L knee grossly 3-/5, L ankle DF 3+/5.   -       Balance Skills Training    Balance Comments  Requires UE assist and AD for standing prolonged time   -       Gait/Stairs Assessment/Training    [place] Level (Gait)  supervision  -    Bilateral Gait Deviations  heel strike decreased;Trendelenburg sign requires WBOS during ambulation, [place] decreased  -    Comment (Gait/Stairs)  Ambulating w/ SPC in clinic this date.   -      User Key  (r) = Recorded By, (t) = Taken By, (c) = Cosigned By    Initials Name Provider Type    Soila Torrez, PT Physical Therapist                                PT Assessment/Plan     Row Name 01/14/19 1900          PT Assessment    Functional Limitations  Decreased safety during functional activities;Impaired gait;Impaired locomotion;Limitation in home management;Performance in self-care ADL  -     Impairments  Balance;Endurance;Gait;Impaired respiration;Locomotion;Muscle strength  -     Assessment  Comments  The pt has decreased functional activity tolerance, impaired endurance, and generalized weakness at this time that place her at an increased fall risk when ambulating. The pt has decreased safety w/ ambulation and a high need for assisted mobility when she experiences an exacerbation of her MS related sxs. The pt was not experiencing a flare-up of MS sxs during today's eval. An electric WC could potentially improve QOL and safety w/ MRADLs and it would potentially decrease the pt's risk of falling.  -MW        PT Plan    PT Plan Comments  D/c-- eval only   -MW       User Key  (r) = Recorded By, (t) = Taken By, (c) = Cosigned By    Initials Name Provider Type    Soila Torrez, PT Physical Therapist          Exercises     Row Name 01/15/19 0800             Subjective Comments    Subjective Comments  See hx  -MW        User Key  (r) = Recorded By, (t) = Taken By, (c) = Cosigned By    Initials Name Provider Type    Soila Torrez, PT Physical Therapist                                 Time Calculation:   Start Time: 1300  Stop Time: 1354  Time Calculation (min): 54 min  Therapy Suggested Charges     Code   Minutes Charges    None           Therapy Charges for Today     Code Description Service Date Service Provider Modifiers Qty    12149609830 HC PT EVAL LOW COMPLEXITY 4 1/14/2019 Soila Lizama, PT GP 1        D/c secondary to eval only for WC.                  Soila Lizama PT  1/15/2019

## 2019-08-17 NOTE — ED NOTES
Pt assisted on and off bedpan at this time. Moderate amt clear yellow urine resulted. Will continue to monitor.      Sudeep Hoskins RN  08/16/19 0286

## 2019-08-17 NOTE — ED NOTES
Pt placed on and off bedpan at this time with 200ml resulted.      Sudeep Hoskins RN  08/17/19 0016
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
## 2019-08-17 NOTE — ED NOTES
"Pt reports nausea is \"much better\" however reports dizziness is \"about the same\". Will continue to monitor.      Sudeep Hoskins RN  08/17/19 6902    "
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
## 2019-10-08 NOTE — TELEPHONE ENCOUNTER
Phylicia Wray from [facility] called wanting to know if its is okay with you to discharge her from home health.  She states the Pt is not having any problems and is doing very well.    She doesn't want to request more visits through her insurance unless its needed       Please call juan @ [phone]

## 2019-10-10 PROBLEM — G89.29 CHRONIC RIGHT SHOULDER PAIN: Status: ACTIVE | Noted: 2019-01-01

## 2019-10-10 PROBLEM — M75.41 IMPINGEMENT SYNDROME OF RIGHT SHOULDER: Status: ACTIVE | Noted: 2019-01-01

## 2019-10-10 PROBLEM — M25.511 CHRONIC RIGHT SHOULDER PAIN: Status: ACTIVE | Noted: 2019-01-01

## 2019-10-10 PROBLEM — M75.101 ROTATOR CUFF SYNDROME, RIGHT: Status: ACTIVE | Noted: 2019-01-01

## 2019-10-10 NOTE — PROGRESS NOTES
"Elizabeth Griffin is a 74 y.o. female returns for     Chief Complaint   Patient presents with   • Right Shoulder - Follow-up       HISTORY OF PRESENT ILLNESS:  F/u right shoulder, patient had MRI done at [facility], patient states pain score is at a 0 today,   Pain is improving  Sleeping better, waking up less now.  No numbness or tingling  Dull ache occasionally       CONCURRENT MEDICAL HISTORY:    The following portions of the patient's history were reviewed and updated as appropriate: allergies, current medications, past family history, past medical history, past social history, past surgical history and problem list.     ROS  No fevers or chills.  No chest pain or shortness of air.  No GI or  disturbances.    PHYSICAL EXAMINATION:       Ht 160 cm (63\")   Wt 80.7 kg (178 lb)   BMI 31.53 kg/m²     Physical Exam   Constitutional: She is oriented to person, place, and time. She appears well-developed and well-nourished.   Neurological: She is alert and oriented to person, place, and time.   Psychiatric: She has a normal mood and affect. Her behavior is normal. Judgment and thought content normal.       GAIT:     []  Normal  []  Antalgic    Assistive device: []  None  []  Walker     []  Crutches  []  Cane     []  Wheelchair  []  Stretcher    Right Shoulder Exam     Tenderness   The patient is experiencing tenderness in the acromioclavicular joint.    Range of Motion   Active abduction: 160   Forward flexion: 170     Muscle Strength   Abduction: 4/5   Supraspinatus: 4/5     Tests   Torres test: negative  Impingement: negative    Other   Erythema: absent  Sensation: normal  Pulse: present                Reviewed MRI and results.  Undersurface tear of the supraspinatus  Abnormal biceps  Mild impingement.  Bursitis.          ASSESSMENT:    Diagnoses and all orders for this visit:    Rotator cuff syndrome, right    Chronic right shoulder pain    Impingement syndrome of right shoulder        Patient's Body " mass index is 31.53 kg/m². BMI is above normal parameters. Recommendations include: exercise counseling and referral to a nutritionist.    PLAN    Continue HEP  Continue exercises  Slowly progress as tolerated.  No restrictions  Discussed possible steroid injection if symptoms worsen.    Return if symptoms worsen or fail to improve, for recheck.    Charles Whatley MD